# Patient Record
Sex: MALE | Race: BLACK OR AFRICAN AMERICAN
[De-identification: names, ages, dates, MRNs, and addresses within clinical notes are randomized per-mention and may not be internally consistent; named-entity substitution may affect disease eponyms.]

---

## 2019-08-05 ENCOUNTER — HOSPITAL ENCOUNTER (OUTPATIENT)
Dept: HOSPITAL 74 - FASU-ENDO | Age: 49
Discharge: HOME | End: 2019-08-05
Attending: INTERNAL MEDICINE
Payer: COMMERCIAL

## 2019-08-05 VITALS — TEMPERATURE: 98.4 F

## 2019-08-05 VITALS — SYSTOLIC BLOOD PRESSURE: 129 MMHG | DIASTOLIC BLOOD PRESSURE: 89 MMHG | HEART RATE: 69 BPM

## 2019-08-05 VITALS — BODY MASS INDEX: 30.2 KG/M2

## 2019-08-05 DIAGNOSIS — D12.3: ICD-10-CM

## 2019-08-05 DIAGNOSIS — Z12.11: Primary | ICD-10-CM

## 2019-08-05 PROCEDURE — 0DBL8ZX EXCISION OF TRANSVERSE COLON, VIA NATURAL OR ARTIFICIAL OPENING ENDOSCOPIC, DIAGNOSTIC: ICD-10-PCS | Performed by: INTERNAL MEDICINE

## 2019-08-07 NOTE — PATH
Surgical Pathology Report



Patient Name:  RISA MANSFIELD

Accession #:  [id number]

[facility]. Rec. #:  [id number]                                                        

   /Age/Gender:  1970 (Age: 48) / M

Account:  [id number]                                                          

             Location: [facility]

Taken:  2019

Received:  2019

Reported:  2019

Physicians:  Joaquin Reina M.D.

  



Specimen(s) Received

 POLYP TRANSVERSE COLON 





Clinical History

Screening

Postoperative diagnosis: Polyp







Final Diagnosis

TRANSVERSE COLON POLYP, POLYPECTOMY:

TUBULAR ADENOMA.





***Electronically Signed***

Madison Nolasco M.D.





Gross Description

Received in formalin, labeled "biopsy polyp transverse colon" is a tan,

irregular portion of soft tissue measuring 0.3 cm. in greatest dimension. The

specimen is submitted in toto in one cassette.